# Patient Record
Sex: MALE | Race: WHITE | HISPANIC OR LATINO | Employment: FULL TIME | URBAN - METROPOLITAN AREA
[De-identification: names, ages, dates, MRNs, and addresses within clinical notes are randomized per-mention and may not be internally consistent; named-entity substitution may affect disease eponyms.]

---

## 2020-07-07 ENCOUNTER — APPOINTMENT (OUTPATIENT)
Dept: RADIOLOGY | Facility: MEDICAL CENTER | Age: 35
End: 2020-07-07
Attending: EMERGENCY MEDICINE
Payer: COMMERCIAL

## 2020-07-07 ENCOUNTER — HOSPITAL ENCOUNTER (OUTPATIENT)
Facility: MEDICAL CENTER | Age: 35
End: 2020-07-08
Attending: EMERGENCY MEDICINE | Admitting: HOSPITALIST
Payer: COMMERCIAL

## 2020-07-07 DIAGNOSIS — R07.9 CHEST PAIN, UNSPECIFIED TYPE: ICD-10-CM

## 2020-07-07 PROBLEM — E11.9 TYPE II DIABETES MELLITUS (HCC): Status: ACTIVE | Noted: 2020-07-07

## 2020-07-07 PROBLEM — Z86.59 HISTORY OF PANIC ATTACKS: Status: ACTIVE | Noted: 2020-07-07

## 2020-07-07 PROBLEM — R07.89 ATYPICAL CHEST PAIN: Status: ACTIVE | Noted: 2020-07-07

## 2020-07-07 LAB
ALBUMIN SERPL BCP-MCNC: 4.9 G/DL (ref 3.2–4.9)
ALBUMIN/GLOB SERPL: 1.4 G/DL
ALP SERPL-CCNC: 57 U/L (ref 30–99)
ALT SERPL-CCNC: 12 U/L (ref 2–50)
ANION GAP SERPL CALC-SCNC: 9 MMOL/L (ref 7–16)
AST SERPL-CCNC: 19 U/L (ref 12–45)
BASOPHILS # BLD AUTO: 1.1 % (ref 0–1.8)
BASOPHILS # BLD: 0.07 K/UL (ref 0–0.12)
BILIRUB SERPL-MCNC: 1 MG/DL (ref 0.1–1.5)
BUN SERPL-MCNC: 11 MG/DL (ref 8–22)
CALCIUM SERPL-MCNC: 9.7 MG/DL (ref 8.4–10.2)
CHLORIDE SERPL-SCNC: 100 MMOL/L (ref 96–112)
CO2 SERPL-SCNC: 27 MMOL/L (ref 20–33)
COVID ORDER STATUS COVID19: NORMAL
CREAT SERPL-MCNC: 0.71 MG/DL (ref 0.5–1.4)
EKG IMPRESSION: NORMAL
EKG IMPRESSION: NORMAL
EOSINOPHIL # BLD AUTO: 0.1 K/UL (ref 0–0.51)
EOSINOPHIL NFR BLD: 1.6 % (ref 0–6.9)
ERYTHROCYTE [DISTWIDTH] IN BLOOD BY AUTOMATED COUNT: 36.8 FL (ref 35.9–50)
GLOBULIN SER CALC-MCNC: 3.5 G/DL (ref 1.9–3.5)
GLUCOSE SERPL-MCNC: 188 MG/DL (ref 65–99)
HCT VFR BLD AUTO: 49.5 % (ref 42–52)
HGB BLD-MCNC: 17.3 G/DL (ref 14–18)
IMM GRANULOCYTES # BLD AUTO: 0.02 K/UL (ref 0–0.11)
IMM GRANULOCYTES NFR BLD AUTO: 0.3 % (ref 0–0.9)
LYMPHOCYTES # BLD AUTO: 2.59 K/UL (ref 1–4.8)
LYMPHOCYTES NFR BLD: 41.2 % (ref 22–41)
MCH RBC QN AUTO: 29.8 PG (ref 27–33)
MCHC RBC AUTO-ENTMCNC: 34.9 G/DL (ref 33.7–35.3)
MCV RBC AUTO: 85.3 FL (ref 81.4–97.8)
MONOCYTES # BLD AUTO: 0.37 K/UL (ref 0–0.85)
MONOCYTES NFR BLD AUTO: 5.9 % (ref 0–13.4)
NEUTROPHILS # BLD AUTO: 3.14 K/UL (ref 1.82–7.42)
NEUTROPHILS NFR BLD: 49.9 % (ref 44–72)
NRBC # BLD AUTO: 0 K/UL
NRBC BLD-RTO: 0 /100 WBC
PLATELET # BLD AUTO: 275 K/UL (ref 164–446)
PMV BLD AUTO: 10.4 FL (ref 9–12.9)
POTASSIUM SERPL-SCNC: 4.2 MMOL/L (ref 3.6–5.5)
PROT SERPL-MCNC: 8.4 G/DL (ref 6–8.2)
RBC # BLD AUTO: 5.8 M/UL (ref 4.7–6.1)
SODIUM SERPL-SCNC: 136 MMOL/L (ref 135–145)
TROPONIN T SERPL-MCNC: <6 NG/L (ref 6–19)
TROPONIN T SERPL-MCNC: <6 NG/L (ref 6–19)
WBC # BLD AUTO: 6.3 K/UL (ref 4.8–10.8)

## 2020-07-07 PROCEDURE — 93005 ELECTROCARDIOGRAM TRACING: CPT | Performed by: EMERGENCY MEDICINE

## 2020-07-07 PROCEDURE — 84484 ASSAY OF TROPONIN QUANT: CPT

## 2020-07-07 PROCEDURE — 700111 HCHG RX REV CODE 636 W/ 250 OVERRIDE (IP): Performed by: HOSPITALIST

## 2020-07-07 PROCEDURE — 71045 X-RAY EXAM CHEST 1 VIEW: CPT

## 2020-07-07 PROCEDURE — U0003 INFECTIOUS AGENT DETECTION BY NUCLEIC ACID (DNA OR RNA); SEVERE ACUTE RESPIRATORY SYNDROME CORONAVIRUS 2 (SARS-COV-2) (CORONAVIRUS DISEASE [COVID-19]), AMPLIFIED PROBE TECHNIQUE, MAKING USE OF HIGH THROUGHPUT TECHNOLOGIES AS DESCRIBED BY CMS-2020-01-R: HCPCS

## 2020-07-07 PROCEDURE — G0378 HOSPITAL OBSERVATION PER HR: HCPCS

## 2020-07-07 PROCEDURE — 93005 ELECTROCARDIOGRAM TRACING: CPT

## 2020-07-07 PROCEDURE — 99285 EMERGENCY DEPT VISIT HI MDM: CPT

## 2020-07-07 PROCEDURE — 700102 HCHG RX REV CODE 250 W/ 637 OVERRIDE(OP): Performed by: EMERGENCY MEDICINE

## 2020-07-07 PROCEDURE — 85025 COMPLETE CBC W/AUTO DIFF WBC: CPT

## 2020-07-07 PROCEDURE — 36415 COLL VENOUS BLD VENIPUNCTURE: CPT

## 2020-07-07 PROCEDURE — 99219 PR INITIAL OBSERVATION CARE,LEVL II: CPT | Performed by: HOSPITALIST

## 2020-07-07 PROCEDURE — 96374 THER/PROPH/DIAG INJ IV PUSH: CPT

## 2020-07-07 PROCEDURE — C9803 HOPD COVID-19 SPEC COLLECT: HCPCS | Performed by: HOSPITALIST

## 2020-07-07 PROCEDURE — 80053 COMPREHEN METABOLIC PANEL: CPT

## 2020-07-07 PROCEDURE — A9270 NON-COVERED ITEM OR SERVICE: HCPCS | Performed by: EMERGENCY MEDICINE

## 2020-07-07 RX ORDER — AMOXICILLIN 250 MG
2 CAPSULE ORAL 2 TIMES DAILY
Status: DISCONTINUED | OUTPATIENT
Start: 2020-07-07 | End: 2020-07-08 | Stop reason: HOSPADM

## 2020-07-07 RX ORDER — ONDANSETRON 4 MG/1
4 TABLET, ORALLY DISINTEGRATING ORAL EVERY 4 HOURS PRN
Status: DISCONTINUED | OUTPATIENT
Start: 2020-07-07 | End: 2020-07-08 | Stop reason: HOSPADM

## 2020-07-07 RX ORDER — ASPIRIN 325 MG
325 TABLET ORAL ONCE
Status: DISCONTINUED | OUTPATIENT
Start: 2020-07-07 | End: 2020-07-07

## 2020-07-07 RX ORDER — HYDRALAZINE HYDROCHLORIDE 20 MG/ML
10 INJECTION INTRAMUSCULAR; INTRAVENOUS ONCE
Status: COMPLETED | OUTPATIENT
Start: 2020-07-07 | End: 2020-07-07

## 2020-07-07 RX ORDER — ONDANSETRON 2 MG/ML
4 INJECTION INTRAMUSCULAR; INTRAVENOUS EVERY 4 HOURS PRN
Status: DISCONTINUED | OUTPATIENT
Start: 2020-07-07 | End: 2020-07-08 | Stop reason: HOSPADM

## 2020-07-07 RX ORDER — ASPIRIN 325 MG
325 TABLET ORAL DAILY
Status: DISCONTINUED | OUTPATIENT
Start: 2020-07-08 | End: 2020-07-08 | Stop reason: HOSPADM

## 2020-07-07 RX ORDER — PROMETHAZINE HYDROCHLORIDE 25 MG/1
12.5-25 SUPPOSITORY RECTAL EVERY 4 HOURS PRN
Status: DISCONTINUED | OUTPATIENT
Start: 2020-07-07 | End: 2020-07-08 | Stop reason: HOSPADM

## 2020-07-07 RX ORDER — BISACODYL 10 MG
10 SUPPOSITORY, RECTAL RECTAL
Status: DISCONTINUED | OUTPATIENT
Start: 2020-07-07 | End: 2020-07-08 | Stop reason: HOSPADM

## 2020-07-07 RX ORDER — POLYETHYLENE GLYCOL 3350 17 G/17G
1 POWDER, FOR SOLUTION ORAL
Status: DISCONTINUED | OUTPATIENT
Start: 2020-07-07 | End: 2020-07-08 | Stop reason: HOSPADM

## 2020-07-07 RX ORDER — DIAZEPAM 5 MG/1
5 TABLET ORAL ONCE
Status: COMPLETED | OUTPATIENT
Start: 2020-07-07 | End: 2020-07-07

## 2020-07-07 RX ORDER — PROCHLORPERAZINE EDISYLATE 5 MG/ML
5-10 INJECTION INTRAMUSCULAR; INTRAVENOUS EVERY 4 HOURS PRN
Status: DISCONTINUED | OUTPATIENT
Start: 2020-07-07 | End: 2020-07-08 | Stop reason: HOSPADM

## 2020-07-07 RX ORDER — ASPIRIN 325 MG
325 TABLET ORAL ONCE
Status: COMPLETED | OUTPATIENT
Start: 2020-07-07 | End: 2020-07-07

## 2020-07-07 RX ORDER — PROMETHAZINE HYDROCHLORIDE 25 MG/1
12.5-25 TABLET ORAL EVERY 4 HOURS PRN
Status: DISCONTINUED | OUTPATIENT
Start: 2020-07-07 | End: 2020-07-08 | Stop reason: HOSPADM

## 2020-07-07 RX ORDER — ASPIRIN 81 MG/1
324 TABLET, CHEWABLE ORAL DAILY
Status: DISCONTINUED | OUTPATIENT
Start: 2020-07-08 | End: 2020-07-08 | Stop reason: HOSPADM

## 2020-07-07 RX ORDER — ASPIRIN 600 MG/1
300 SUPPOSITORY RECTAL DAILY
Status: DISCONTINUED | OUTPATIENT
Start: 2020-07-08 | End: 2020-07-08 | Stop reason: HOSPADM

## 2020-07-07 RX ADMIN — DIAZEPAM 5 MG: 5 TABLET ORAL at 19:27

## 2020-07-07 RX ADMIN — HYDRALAZINE HYDROCHLORIDE 10 MG: 20 INJECTION INTRAMUSCULAR; INTRAVENOUS at 22:33

## 2020-07-07 RX ADMIN — ASPIRIN 325 MG ORAL TABLET 325 MG: 325 PILL ORAL at 21:00

## 2020-07-07 ASSESSMENT — LIFESTYLE VARIABLES
HOW MANY TIMES IN THE PAST YEAR HAVE YOU HAD 5 OR MORE DRINKS IN A DAY: 0
AVERAGE NUMBER OF DAYS PER WEEK YOU HAVE A DRINK CONTAINING ALCOHOL: 0
HAVE YOU EVER FELT YOU SHOULD CUT DOWN ON YOUR DRINKING: NO
TOTAL SCORE: 0
ON A TYPICAL DAY WHEN YOU DRINK ALCOHOL HOW MANY DRINKS DO YOU HAVE: 0
HAVE PEOPLE ANNOYED YOU BY CRITICIZING YOUR DRINKING: NO
EVER FELT BAD OR GUILTY ABOUT YOUR DRINKING: NO
EVER_SMOKED: YES
EVER HAD A DRINK FIRST THING IN THE MORNING TO STEADY YOUR NERVES TO GET RID OF A HANGOVER: NO
CONSUMPTION TOTAL: NEGATIVE
ALCOHOL_USE: NO

## 2020-07-07 ASSESSMENT — COGNITIVE AND FUNCTIONAL STATUS - GENERAL
DAILY ACTIVITIY SCORE: 24
MOBILITY SCORE: 24
SUGGESTED CMS G CODE MODIFIER DAILY ACTIVITY: CH
SUGGESTED CMS G CODE MODIFIER MOBILITY: CH

## 2020-07-07 ASSESSMENT — ENCOUNTER SYMPTOMS
SHORTNESS OF BREATH: 1
SORE THROAT: 0
MYALGIAS: 0
FEVER: 0
VOMITING: 0
HEADACHES: 0
BLURRED VISION: 0
NAUSEA: 1
PALPITATIONS: 0
COUGH: 0
DOUBLE VISION: 0
NECK PAIN: 0
DEPRESSION: 0
WEAKNESS: 0
LOSS OF CONSCIOUSNESS: 1
INSOMNIA: 0
BRUISES/BLEEDS EASILY: 0
DIZZINESS: 0

## 2020-07-07 ASSESSMENT — COPD QUESTIONNAIRES
DO YOU EVER COUGH UP ANY MUCUS OR PHLEGM?: NO/ONLY WITH OCCASIONAL COLDS OR INFECTIONS
COPD SCREENING SCORE: 1
HAVE YOU SMOKED AT LEAST 100 CIGARETTES IN YOUR ENTIRE LIFE: NO/DON'T KNOW
IN THE PAST 12 MONTHS DO YOU DO LESS THAN YOU USED TO BECAUSE OF YOUR BREATHING PROBLEMS: DISAGREE/UNSURE
DURING THE PAST 4 WEEKS HOW MUCH DID YOU FEEL SHORT OF BREATH: SOME OF THE TIME

## 2020-07-07 ASSESSMENT — PATIENT HEALTH QUESTIONNAIRE - PHQ9
3. TROUBLE FALLING OR STAYING ASLEEP OR SLEEPING TOO MUCH: NEARLY EVERY DAY
9. THOUGHTS THAT YOU WOULD BE BETTER OFF DEAD, OR OF HURTING YOURSELF: NOT AT ALL
1. LITTLE INTEREST OR PLEASURE IN DOING THINGS: MORE THAN HALF THE DAYS
SUM OF ALL RESPONSES TO PHQ QUESTIONS 1-9: 13
5. POOR APPETITE OR OVEREATING: NEARLY EVERY DAY
6. FEELING BAD ABOUT YOURSELF - OR THAT YOU ARE A FAILURE OR HAVE LET YOURSELF OR YOUR FAMILY DOWN: NOT AL ALL
2. FEELING DOWN, DEPRESSED, IRRITABLE, OR HOPELESS: SEVERAL DAYS
8. MOVING OR SPEAKING SO SLOWLY THAT OTHER PEOPLE COULD HAVE NOTICED. OR THE OPPOSITE, BEING SO FIGETY OR RESTLESS THAT YOU HAVE BEEN MOVING AROUND A LOT MORE THAN USUAL: SEVERAL DAYS
7. TROUBLE CONCENTRATING ON THINGS, SUCH AS READING THE NEWSPAPER OR WATCHING TELEVISION: SEVERAL DAYS
SUM OF ALL RESPONSES TO PHQ9 QUESTIONS 1 AND 2: 3
4. FEELING TIRED OR HAVING LITTLE ENERGY: MORE THAN HALF THE DAYS

## 2020-07-07 ASSESSMENT — FIBROSIS 4 INDEX: FIB4 SCORE: 0.74

## 2020-07-08 ENCOUNTER — APPOINTMENT (OUTPATIENT)
Dept: RADIOLOGY | Facility: MEDICAL CENTER | Age: 35
End: 2020-07-08
Attending: HOSPITALIST
Payer: COMMERCIAL

## 2020-07-08 VITALS
BODY MASS INDEX: 29.04 KG/M2 | RESPIRATION RATE: 18 BRPM | HEIGHT: 71 IN | WEIGHT: 207.45 LBS | DIASTOLIC BLOOD PRESSURE: 78 MMHG | SYSTOLIC BLOOD PRESSURE: 145 MMHG | HEART RATE: 75 BPM | TEMPERATURE: 99.4 F | OXYGEN SATURATION: 97 %

## 2020-07-08 PROBLEM — Z86.59 HISTORY OF PANIC ATTACKS: Status: RESOLVED | Noted: 2020-07-07 | Resolved: 2020-07-08

## 2020-07-08 PROBLEM — R07.89 ATYPICAL CHEST PAIN: Status: RESOLVED | Noted: 2020-07-07 | Resolved: 2020-07-08

## 2020-07-08 LAB
AMPHET UR QL SCN: NEGATIVE
BARBITURATES UR QL SCN: NEGATIVE
BENZODIAZ UR QL SCN: NEGATIVE
BZE UR QL SCN: NEGATIVE
CANNABINOIDS UR QL SCN: POSITIVE
EKG IMPRESSION: NORMAL
GLUCOSE BLD-MCNC: 133 MG/DL (ref 65–99)
GLUCOSE BLD-MCNC: 154 MG/DL (ref 65–99)
METHADONE UR QL SCN: NEGATIVE
OPIATES UR QL SCN: NEGATIVE
OXYCODONE UR QL SCN: NEGATIVE
PCP UR QL SCN: NEGATIVE
PROPOXYPH UR QL SCN: NEGATIVE
SARS-COV-2 RNA RESP QL NAA+PROBE: NOTDETECTED
SPECIMEN SOURCE: NORMAL
TROPONIN T SERPL-MCNC: <6 NG/L (ref 6–19)

## 2020-07-08 PROCEDURE — 93010 ELECTROCARDIOGRAM REPORT: CPT | Performed by: INTERNAL MEDICINE

## 2020-07-08 PROCEDURE — 93005 ELECTROCARDIOGRAM TRACING: CPT | Performed by: HOSPITALIST

## 2020-07-08 PROCEDURE — 93018 CV STRESS TEST I&R ONLY: CPT | Performed by: INTERNAL MEDICINE

## 2020-07-08 PROCEDURE — A9502 TC99M TETROFOSMIN: HCPCS

## 2020-07-08 PROCEDURE — 700102 HCHG RX REV CODE 250 W/ 637 OVERRIDE(OP): Performed by: HOSPITALIST

## 2020-07-08 PROCEDURE — 96372 THER/PROPH/DIAG INJ SC/IM: CPT

## 2020-07-08 PROCEDURE — 82962 GLUCOSE BLOOD TEST: CPT

## 2020-07-08 PROCEDURE — 99217 PR OBSERVATION CARE DISCHARGE: CPT | Performed by: HOSPITALIST

## 2020-07-08 PROCEDURE — A9270 NON-COVERED ITEM OR SERVICE: HCPCS | Performed by: HOSPITALIST

## 2020-07-08 PROCEDURE — 84484 ASSAY OF TROPONIN QUANT: CPT

## 2020-07-08 PROCEDURE — 80307 DRUG TEST PRSMV CHEM ANLYZR: CPT

## 2020-07-08 PROCEDURE — G0378 HOSPITAL OBSERVATION PER HR: HCPCS

## 2020-07-08 PROCEDURE — 78452 HT MUSCLE IMAGE SPECT MULT: CPT | Mod: 26 | Performed by: INTERNAL MEDICINE

## 2020-07-08 RX ADMIN — INSULIN HUMAN 2 UNITS: 100 INJECTION, SOLUTION PARENTERAL at 13:00

## 2020-07-08 RX ADMIN — ASPIRIN 325 MG ORAL TABLET 325 MG: 325 PILL ORAL at 05:32

## 2020-07-08 NOTE — ASSESSMENT & PLAN NOTE
-Patient has diabetes however is noncompliant with his medications for the past few months.  His glucose on admission is 180.  -Order regular insulin sliding scale.

## 2020-07-08 NOTE — PROGRESS NOTES
0000 Assessment complete. No pain at this time. Pt aware of POC and diet. Pt mentioned how he sometimes has drainage that comes out of his left ear and is starting to effect his hearing. Will bring up with day shift.     0413 Pt sleeping, no needs.

## 2020-07-08 NOTE — ASSESSMENT & PLAN NOTE
-Patient status to telemetry unit.  -Initial troponin levels are negative however patient does have some EKG changes on nonconsecutive leads as described above.  -Order stress test in the morning.  -Patient received aspirin in the emergency department.    -Treat pain PRN.

## 2020-07-08 NOTE — ED PROVIDER NOTES
ED Provider Note    CHIEF COMPLAINT  Chief Complaint   Patient presents with   • Numbness   • Rapid Heart Beat       HPI  Dale Espana is a 35 y.o. male who presents with palpitations and chest pain.  The patient states over the weekend he was on a long drive and when he got home on Sunday night he had some marijuana and that intercourse with his wife.  He states after intercourse he became extremely anxious and developed palpitations and diaphoresis.  He states he is having hard time breathing and therefore he contacted EMS.  EMS arrived and told the patient he most likely is having anxiety attack.  Since that time he has had periodic spells of palpitations and difficulty with breathing.  He states at times he also has paresthesias to his hands as well as muscle spasm.  He has not had any associated vomiting or diarrhea.  He states he is otherwise healthy.    REVIEW OF SYSTEMS  See HPI for further details. All other systems are negative.     PAST MEDICAL HISTORY  No past medical history on file.    FAMILY HISTORY  [unfilled]    SOCIAL HISTORY  Social History     Socioeconomic History   • Marital status: Not on file     Spouse name: Not on file   • Number of children: Not on file   • Years of education: Not on file   • Highest education level: Not on file   Occupational History   • Not on file   Social Needs   • Financial resource strain: Not on file   • Food insecurity     Worry: Not on file     Inability: Not on file   • Transportation needs     Medical: Not on file     Non-medical: Not on file   Tobacco Use   • Smoking status: Never Smoker   • Smokeless tobacco: Never Used   Substance and Sexual Activity   • Alcohol use: Yes     Comment: was drinking heavily, stopped x 2 mo ago   • Drug use: Yes     Types: Inhaled     Comment: marijuana occasionally   • Sexual activity: Not on file   Lifestyle   • Physical activity     Days per week: Not on file     Minutes per session: Not on file   • Stress: Not on  "file   Relationships   • Social connections     Talks on phone: Not on file     Gets together: Not on file     Attends [employer] service: Not on file     Active member of club or organization: Not on file     Attends meetings of clubs or organizations: Not on file     Relationship status: Not on file   • Intimate partner violence     Fear of current or ex partner: Not on file     Emotionally abused: Not on file     Physically abused: Not on file     Forced sexual activity: Not on file   Other Topics Concern   • Not on file   Social History Narrative   • Not on file       SURGICAL HISTORY  No past surgical history on file.    CURRENT MEDICATIONS  Home Medications     Reviewed by Darline Chappell R.N. (Registered Nurse) on 07/07/20 at 1717  Med List Status: Complete   Medication Last Dose Status        Patient Christopher Taking any Medications                       ALLERGIES  No Known Allergies    PHYSICAL EXAM  VITAL SIGNS: BP (!) 192/110 Comment: nurse aware patient BP  Pulse 96   Temp 36.5 °C (97.7 °F) (Temporal)   Resp 18   Ht 1.803 m (5' 11\")   Wt 94.9 kg (209 lb 3.5 oz)   SpO2 94%   BMI 29.18 kg/m²       Constitutional: Well developed, Well nourished, No acute distress, Non-toxic appearance.   HENT: Normocephalic, Atraumatic, Bilateral external ears normal, Oropharynx moist, No oral exudates, Nose normal.   Eyes: PERRLA, EOMI, Conjunctiva normal, No discharge.   Neck: Normal range of motion, No tenderness, Supple, No stridor.   Lymphatic: No lymphadenopathy noted.   Cardiovascular: Normal heart rate, Normal rhythm, No murmurs, No rubs, No gallops.   Thorax & Lungs: Normal breath sounds, No respiratory distress, No wheezing, No chest tenderness.   Abdomen: Bowel sounds normal, Soft, No tenderness, No masses, No pulsatile masses.   Skin: Warm, Dry, No erythema, No rash.   Back: No tenderness, No CVA tenderness.   Extremities: Intact distal pulses, No edema, No tenderness, No cyanosis, No clubbing.   Neurologic: " Alert & oriented x 3, Normal motor function, Normal sensory function, No focal deficits noted.   Psychiatric: Anxious    Results for orders placed or performed during the hospital encounter of 07/07/20   CBC with Differential   Result Value Ref Range    WBC 6.3 4.8 - 10.8 K/uL    RBC 5.80 4.70 - 6.10 M/uL    Hemoglobin 17.3 14.0 - 18.0 g/dL    Hematocrit 49.5 42.0 - 52.0 %    MCV 85.3 81.4 - 97.8 fL    MCH 29.8 27.0 - 33.0 pg    MCHC 34.9 33.7 - 35.3 g/dL    RDW 36.8 35.9 - 50.0 fL    Platelet Count 275 164 - 446 K/uL    MPV 10.4 9.0 - 12.9 fL    Neutrophils-Polys 49.90 44.00 - 72.00 %    Lymphocytes 41.20 (H) 22.00 - 41.00 %    Monocytes 5.90 0.00 - 13.40 %    Eosinophils 1.60 0.00 - 6.90 %    Basophils 1.10 0.00 - 1.80 %    Immature Granulocytes 0.30 0.00 - 0.90 %    Nucleated RBC 0.00 /100 WBC    Neutrophils (Absolute) 3.14 1.82 - 7.42 K/uL    Lymphs (Absolute) 2.59 1.00 - 4.80 K/uL    Monos (Absolute) 0.37 0.00 - 0.85 K/uL    Eos (Absolute) 0.10 0.00 - 0.51 K/uL    Baso (Absolute) 0.07 0.00 - 0.12 K/uL    Immature Granulocytes (abs) 0.02 0.00 - 0.11 K/uL    NRBC (Absolute) 0.00 K/uL   Complete Metabolic Panel (CMP)   Result Value Ref Range    Sodium 136 135 - 145 mmol/L    Potassium 4.2 3.6 - 5.5 mmol/L    Chloride 100 96 - 112 mmol/L    Co2 27 20 - 33 mmol/L    Anion Gap 9.0 7.0 - 16.0    Glucose 188 (H) 65 - 99 mg/dL    Bun 11 8 - 22 mg/dL    Creatinine 0.71 0.50 - 1.40 mg/dL    Calcium 9.7 8.4 - 10.2 mg/dL    AST(SGOT) 19 12 - 45 U/L    ALT(SGPT) 12 2 - 50 U/L    Alkaline Phosphatase 57 30 - 99 U/L    Total Bilirubin 1.0 0.1 - 1.5 mg/dL    Albumin 4.9 3.2 - 4.9 g/dL    Total Protein 8.4 (H) 6.0 - 8.2 g/dL    Globulin 3.5 1.9 - 3.5 g/dL    A-G Ratio 1.4 g/dL   Troponin   Result Value Ref Range    Troponin T <6 6 - 19 ng/L   ESTIMATED GFR   Result Value Ref Range    GFR If African American >60 >60 mL/min/1.73 m 2    GFR If Non African American >60 >60 mL/min/1.73 m 2   EKG (NOW)   Result Value Ref Range     Report       [facility]    Test Date:  2020  Pt Name:    ADAM PELAEZ      Department: EDS  MRN:        [id number]                      Room:       [facility] 1  Gender:     Male                         Technician: DARNELL  :        1985                   Requested By:ER TRIAGE PROTOCOL  Order #:    [id number]                    Reading MD: CHIP DESIR MD    Measurements  Intervals                                Axis  Rate:       70                           P:          52  CA:         189                          QRS:        23  QRSD:       82                           T:          29  QT:         354  QTc:        382    Interpretive Statements  Twelve-lead EKG shows a normal sinus rhythm with a ventricular rate of 70,  normal QRS, normal intervals, no ST segment elevation that meets criteria  with 1  box and 2 continuous leads, T wave inverted in lead III.  Electronically Signed On 2020 19:23:32 PDT by CHIP DESIR MD     EKG   Result Value Ref Range    Report       [facility]    Test Date:  2020  Pt Name:    ADAM PELAEZ      Department: [facility]  MRN:        [id number]                      Room:       [facility] 1  Gender:     Male                         Technician: MARIBEL  :        1985                   Requested By:CHIP DESIR  Order #:    [id number]                    Reading MD: CHIP DESIR MD    Measurements  Intervals                                Axis  Rate:       63                           P:          29  CA:         184                          QRS:        25  QRSD:       84                           T:          19  QT:         380  QTc:        389    Interpretive Statements  Twelve-lead EKG shows a normal sinus rhythm with a ventricular rate of 63, T  wave continues to be inverted in lead III, patient also continues have T wave    inversion in V3 as well as some  T wave inversion in V4  Electronically Signed On 7-7-2020 20:48:02 PDT by SALO PARIKH MD         RADIOLOGY/PROCEDURES  DX-CHEST-LIMITED (1 VIEW)   Final Result      1.  Asymmetric alveolar opacity in the left upper lobe projecting over the superior aspect of the left hilum which may represent atelectasis or pneumonia.      2.  Clear right lung.            COURSE & MEDICAL DECISION MAKING  Pertinent Labs & Imaging studies reviewed. (See chart for details)  This a 35-year-old gentleman who presents with palpitations and chest pain.  My suspicion is this is all secondary to anxiety.  His initial EKG did show some ST elevation in lead III but no other continuous leads.  After approximately 2 hours repeated EKG and now it appears he has some elevation in V2 and has acute T wave inversion in V4.  Based on these dynamic changes will admit the patient to the hospital for further cardiac rule out.  He did receive Valium and feels significantly better on repeat exam and his blood pressure has come down.  My suspicion is this is all from anxiety but due to the dynamic EKG changes the patient be admitted for further cardiac rule out.  Otherwise as for other possible sources his abdomen is benign.  The patient does have a possible infiltrate versus atelectasis and I suspect this is more atelectasis as he has not had any fever nor cough to support pneumonia.  The patient will be admitted to the hospitalist in stable condition for further cardiac rule out and work-up.    FINAL IMPRESSION  1.  Chest pain  2.  Hypertension    Disposition  The patient will be admitted in stable condition         Electronically signed by: Salo Parikh M.D., 7/7/2020 7:17 PM

## 2020-07-08 NOTE — H&P
Hospital Medicine History & Physical Note    Date of Service  7/7/2020    Primary Care Physician  No primary care provider on file.    Code Status  Full Code    Chief Complaint  Chest Pain    History of Presenting Illness  35 y.o. male, with history of anxiety and diabetes (however off his medications for a little while), who presented emergency department on 7/7/2020 complaining of chest discomfort, worsening anxiety.  Patient states that 3 days ago had an anxiety/panic attack, so severe that he call EMS.  This happened shortly after he smoked weed.  EMS evaluated him and determined he had a panic attack reason why he was not seen that day.  Since yesterday.  He reports having 3-4 episodes of chest discomfort with radiation to the arm and some arm numbness and tingling, and also radiation to his neck.  Symptoms typically last 15 to 20 minutes.  Today, he noticed worsening shortness of breath, nausea and diaphoresis, reason why he decided to come to the emergency department for further evaluation.  She denies having any recent cardiac work-up done.    ER COURSE:  -Initial EKG showed normal sinus rhythm 63 bpm with inverted T waves in lead III and V3-V4.  He also has 1 mm ST elevation in lead V2.  -Initial troponin was negative.  He is symptoms free at this time.  Next    Review of Systems  Review of Systems   Constitutional: Negative for fever.   HENT: Negative for congestion and sore throat.    Eyes: Negative for blurred vision and double vision.   Respiratory: Positive for shortness of breath. Negative for cough.    Cardiovascular: Positive for chest pain. Negative for palpitations.   Gastrointestinal: Positive for nausea. Negative for vomiting.   Genitourinary: Negative for dysuria and urgency.   Musculoskeletal: Negative for myalgias and neck pain.   Skin: Negative for itching and rash.   Neurological: Positive for loss of consciousness (Chest Pain). Negative for dizziness, weakness and headaches.    Endo/Heme/Allergies: Does not bruise/bleed easily.   Psychiatric/Behavioral: Negative for depression. The patient does not have insomnia.        Past Medical History  Anxiety, type 2 diabetes, noncompliant    Surgical History  Appendectomy    Family History  Father  of a stroke at age 57    Social History   reports that he has never smoked. He has never used smokeless tobacco. He reports current alcohol use. He reports current drug use. Drug: Inhaled.    Allergies  No Known Allergies    Medications  None       Physical Exam  Temp:  [36.5 °C (97.7 °F)] 36.5 °C (97.7 °F)  Pulse:  [63-96] 77  Resp:  [18] 18  BP: (167-192)/() 167/99  SpO2:  [94 %-99 %] 94 %    Physical Exam  Constitutional:       Appearance: Normal appearance.   HENT:      Head: Normocephalic and atraumatic.      Nose: Nose normal.      Mouth/Throat:      Mouth: Mucous membranes are moist.      Pharynx: Oropharynx is clear.   Eyes:      Extraocular Movements: Extraocular movements intact.      Pupils: Pupils are equal, round, and reactive to light.   Neck:      Musculoskeletal: Normal range of motion and neck supple.   Cardiovascular:      Rate and Rhythm: Normal rate and regular rhythm.      Pulses: Normal pulses.      Heart sounds: Normal heart sounds.   Pulmonary:      Effort: Pulmonary effort is normal.      Breath sounds: Normal breath sounds.   Abdominal:      General: Abdomen is flat. Bowel sounds are normal.      Palpations: Abdomen is soft.   Skin:     General: Skin is warm and dry.   Neurological:      General: No focal deficit present.      Mental Status: He is alert and oriented to person, place, and time.   Psychiatric:         Mood and Affect: Mood normal.         Behavior: Behavior normal.         Laboratory:  Recent Labs     20  1841   WBC 6.3   RBC 5.80   HEMOGLOBIN 17.3   HEMATOCRIT 49.5   MCV 85.3   MCH 29.8   MCHC 34.9   RDW 36.8   PLATELETCT 275   MPV 10.4     Recent Labs     20  1841   SODIUM 136   POTASSIUM  4.2   CHLORIDE 100   CO2 27   GLUCOSE 188*   BUN 11   CREATININE 0.71   CALCIUM 9.7     Recent Labs     07/07/20  1841   ALTSGPT 12   ASTSGOT 19   ALKPHOSPHAT 57   TBILIRUBIN 1.0   GLUCOSE 188*         No results for input(s): NTPROBNP in the last 72 hours.      Recent Labs     07/07/20  1841 07/07/20  2030   TROPONINT <6 <6       Imaging:  DX-CHEST-LIMITED (1 VIEW)   Final Result      1.  Asymmetric alveolar opacity in the left upper lobe projecting over the superior aspect of the left hilum which may represent atelectasis or pneumonia.      2.  Clear right lung.      NM-CARDIAC STRESS TEST    (Results Pending)     EKG: NSR @ 63 bpm, T wave inversions only III, V3 and V4.  1 mm ST elevation in lead V2.  On repeat EKG there is also on a slightly 1 mm ST elevation on lead III. patient is symptoms free at this time    Assessment/Plan:    * Atypical chest pain  Assessment & Plan  -Patient status to telemetry unit.  -Initial troponin levels are negative however patient does have some EKG changes on nonconsecutive leads as described above.  -Order stress test in the morning.  -Patient received aspirin in the emergency department.    -Treat pain PRN.    History of panic attacks  Assessment & Plan  -Patient has ongoing history of panic attacks, not currently on any medication and treatment.    Type II diabetes mellitus (HCC)  Assessment & Plan  -Patient has diabetes however is noncompliant with his medications for the past few months.  His glucose on admission is 180.  -Order regular insulin sliding scale.

## 2020-07-08 NOTE — PROGRESS NOTES
Admit profile complete. Patient given IV Hydralazine per MAR. Patient given water and snacks. Patient aware he is NPO at midnight.

## 2020-07-08 NOTE — DISCHARGE INSTRUCTIONS
Please establish with primary care provider.  You may need repeat imaging to follow the results of your chest x-ray.    ANA MARÍA Molina    Discharge Instructions    Discharged to home by car with relative. Discharged via wheelchair, hospital escort: Refused.  Special equipment needed: Not Applicable    Be sure to schedule a follow-up appointment with your primary care doctor or any specialists as instructed.     Discharge Plan:   Diet Plan: Discussed  Activity Level: Discussed  Confirmed Follow up Appointment: Appointment Scheduled  Confirmed Symptoms Management: Discussed  Medication Reconciliation Updated: Yes    I understand that a diet low in cholesterol, fat, and sodium is recommended for good health. Unless I have been given specific instructions below for another diet, I accept this instruction as my diet prescription.   Other diet: Diabetic    Special Instructions: None    · Is patient discharged on Warfarin / Coumadin?   No     Depression / Suicide Risk    As you are discharged from this [facility] facility, it is important to learn how to keep safe from harming yourself.    Recognize the warning signs:  · Abrupt changes in personality, positive or negative- including increase in energy   · Giving away possessions  · Change in eating patterns- significant weight changes-  positive or negative  · Change in sleeping patterns- unable to sleep or sleeping all the time   · Unwillingness or inability to communicate  · Depression  · Unusual sadness, discouragement and loneliness  · Talk of wanting to die  · Neglect of personal appearance   · Rebelliousness- reckless behavior  · Withdrawal from people/activities they love  · Confusion- inability to concentrate     If you or a loved one observes any of these behaviors or has concerns about self-harm, here's what you can do:  · Talk about it- your feelings and reasons for harming yourself  · Remove any means that you might use to hurt yourself (examples:  pills, rope, extension cords, firearm)  · Get professional help from the community (Mental Health, Substance Abuse, psychological counseling)  · Do not be alone:Call your Safe Contact- someone whom you trust who will be there for you.  · Call your local CRISIS HOTLINE 201-2651 or 683-149-4671  · Call your local Children's Mobile Crisis Response Team [place] (423) 452-0887 or www.[employer]  · Call the toll free National Suicide Prevention Hotlines   · National Suicide Prevention Lifeline 789-210-JVPF (1545)  · National Hope Line Network 800-SUICIDE (748-1935)

## 2020-07-08 NOTE — ED TRIAGE NOTES
Pt returned from road trip on Sunday and smoked more weed than he has been and then had sexual intercourse. Afterwards he felt like his heart was racing and felt anxious. Medics responded to his house and told him it was an anxiety attack. Reports continued intermittent L-sided numbness, heart racing and feeling anxious. States he is insulin dependent diabetic that has not been on meds or seen a provider in 3 yrs.    Pt denies respiratory symptoms, fever, or known covid exposure.

## 2020-07-08 NOTE — CARE PLAN
Problem: Communication  Goal: The ability to communicate needs accurately and effectively will improve  Outcome: PROGRESSING AS EXPECTED   Pt able to communicate needs effectively    Problem: Safety  Goal: Will remain free from injury  Outcome: PROGRESSING AS EXPECTED    socks on. Pt steady on his feet. Area free from clutter

## 2020-07-08 NOTE — ED NOTES
Pt currently requesting that an IV not be started until labs are resulted and the physician has seen him.

## 2020-07-08 NOTE — PROGRESS NOTES
Telemetry Shift Summary    Rhythm SR  HR Range 62-70  Ectopy none  Measurements 0.20/0.10/0.38    Per Tatiana    Normal Values  Rhythm SR  HR Range    Measurements 0.12-0.20 / 0.06-0.10  / 0.30-0.52

## 2020-07-08 NOTE — CARE PLAN
Problem: Communication  Goal: The ability to communicate needs accurately and effectively will improve  Outcome: PROGRESSING AS EXPECTED  Note: Pt will feel comfortable communicating questions and concerns with treatment team. Will continue to foster this relationship.      Problem: Safety  Goal: Will remain free from injury  Outcome: PROGRESSING AS EXPECTED  Note: Call light and personal belongings within reach.

## 2020-07-08 NOTE — PROGRESS NOTES
2 RN Skin Check    2 RN skin check complete.   Devices in place: N/A.  Skin assessed under devices: N\A.  Confirmed pressure ulcers found on: N/A.  New potential pressure ulcers noted on N/A. Wound consult placed N/A.  The following interventions in place remind patient to make frquent positional changes and turns.

## 2020-07-08 NOTE — PROGRESS NOTES
Report received from SARAH Perkins. Pt denies needs at this time. Safety precautions in place. Call light within reach.

## 2020-07-09 NOTE — PROGRESS NOTES
Telemetry Shift Summary    Rhythm SR  HR Range   Ectopy rPVC  Measurements 0.20/0.08/0.32        Normal Values  Rhythm SR  HR Range    Measurements 0.12-0.20 / 0.06-0.10  / 0.30-0.52

## 2020-07-09 NOTE — PROGRESS NOTES
Pt verbalized understanding of discharge instructions.  Iv site removed. Pt was escorted to the elevators, refused an escort down to the front of the hospital.  Pt brought all belongingings down with him to his personal vehicle.

## 2020-07-09 NOTE — DISCHARGE SUMMARY
Discharge Summary    CHIEF COMPLAINT ON ADMISSION  Chief Complaint   Patient presents with   • Numbness   • Rapid Heart Beat       Reason for Admission  Neck Pain, Numbness     Admission Date  7/7/2020    CODE STATUS  Full Code    HPI & HOSPITAL COURSE  35 y.o. male, with history of anxiety and diabetes (however off his medications for a little while), who presented emergency department on 7/7/2020 complaining of chest discomfort, worsening anxiety.  Patient states that 3 days prior to presentation he had an anxiety/panic attack so severe that he call EMS.  This happened shortly after he smoked cannabis.  EMS evaluated him and determined he had a panic attack and did not transport him to the ED.  Since the day before admission he reports having 3-4 episodes of chest discomfort with radiation to the arm and some arm numbness and tingling, and also radiation to his neck.  Symptoms typically last 15 to 20 minutes.    On the day of admission he noticed worsening shortness of breath, nausea and diaphoresis. He decided to come to the emergency department for further evaluation.  He denies having any recent cardiac work-up done.     -Initial EKG showed normal sinus rhythm 63 bpm with inverted T waves in lead III and V3-V4.  He also has 1 mm ST elevation in lead V2.  -Serial troponins were negative.  -Chest x-ray showed asymmetric alveolar opacity in the left upper lobe projecting over the superior aspect of the left hilum which may represent atelectasis or pneumonia.  -Patient advised to follow-up with primary care provider for monitoring and possible repeat imaging as well as diabetes management.  -Cardiac stress test was negative    Today the patient is sitting upright in bed, pleasant and cooperative.  He is fully alert and oriented.  He denies headache, dizziness, problems breathing chest pain and any other problems at this time.  He feels well enough to return home.      Therefore, he is discharged in good and stable  condition to home with close outpatient follow-up.    The patient recovered much more quickly than anticipated on admission.    Discharge Date  7/8/2020    FOLLOW UP ITEMS POST DISCHARGE    DISCHARGE DIAGNOSES  Principal Problem (Resolved):    Atypical chest pain POA: Yes  Active Problems:    Type II diabetes mellitus (HCC) POA: Yes  Resolved Problems:    History of panic attacks POA: Yes      FOLLOW UP  No future appointments.      Schedule an appointment as soon as possible for a visit in 1 week  to establish with Primary care    [address] [address] [phone]  Schedule an appointment as soon as possible for a visit in 1 week  to establish Primary Care      MEDICATIONS ON DISCHARGE     Medication List      You have not been prescribed any medications.         Allergies  No Known Allergies    DIET  Orders Placed This Encounter   Procedures   • Diet NPO     Standing Status:   Standing     Number of Occurrences:   8     Order Specific Question:   Restrict to:     Answer:   Sips with Medications [3]   • Discontinue Diet Tray     Standing Status:   Standing     Number of Occurrences:   1       ACTIVITY  As tolerated.  Weight bearing as tolerated    CONSULTATIONS  None     PROCEDURES  none    LABORATORY  Lab Results   Component Value Date    SODIUM 136 07/07/2020    POTASSIUM 4.2 07/07/2020    CHLORIDE 100 07/07/2020    CO2 27 07/07/2020    GLUCOSE 188 (H) 07/07/2020    BUN 11 07/07/2020    CREATININE 0.71 07/07/2020        Lab Results   Component Value Date    WBC 6.3 07/07/2020    HEMOGLOBIN 17.3 07/07/2020    HEMATOCRIT 49.5 07/07/2020    PLATELETCT 275 07/07/2020      Results from last 7 days   Lab Units 07/08/20  0054 07/07/20  2030 07/07/20  1841   TROPONIN T [id number]  ng/L <6 <6 <6       Total time of the discharge process exceeds 31 minutes.    LUCRECIA Molina.

## 2022-04-08 ENCOUNTER — HOSPITAL ENCOUNTER (EMERGENCY)
Facility: MEDICAL CENTER | Age: 37
End: 2022-04-08
Attending: EMERGENCY MEDICINE
Payer: COMMERCIAL

## 2022-04-08 VITALS
RESPIRATION RATE: 16 BRPM | DIASTOLIC BLOOD PRESSURE: 90 MMHG | SYSTOLIC BLOOD PRESSURE: 152 MMHG | OXYGEN SATURATION: 97 % | HEIGHT: 71 IN | HEART RATE: 75 BPM | TEMPERATURE: 98.1 F | BODY MASS INDEX: 29.01 KG/M2 | WEIGHT: 207.23 LBS

## 2022-04-08 DIAGNOSIS — R51.9 NONINTRACTABLE HEADACHE, UNSPECIFIED CHRONICITY PATTERN, UNSPECIFIED HEADACHE TYPE: ICD-10-CM

## 2022-04-08 DIAGNOSIS — R20.2 TINGLING: ICD-10-CM

## 2022-04-08 DIAGNOSIS — R10.12 LEFT UPPER QUADRANT ABDOMINAL PAIN: ICD-10-CM

## 2022-04-08 LAB
ALBUMIN SERPL BCP-MCNC: 4.4 G/DL (ref 3.2–4.9)
ALBUMIN/GLOB SERPL: 1.8 G/DL
ALP SERPL-CCNC: 76 U/L (ref 30–99)
ALT SERPL-CCNC: 15 U/L (ref 2–50)
ANION GAP SERPL CALC-SCNC: 14 MMOL/L (ref 7–16)
APPEARANCE UR: CLEAR
AST SERPL-CCNC: 17 U/L (ref 12–45)
BASOPHILS # BLD AUTO: 0.8 % (ref 0–1.8)
BASOPHILS # BLD: 0.05 K/UL (ref 0–0.12)
BILIRUB SERPL-MCNC: 0.4 MG/DL (ref 0.1–1.5)
BILIRUB UR QL STRIP.AUTO: NEGATIVE
BUN SERPL-MCNC: 10 MG/DL (ref 8–22)
CALCIUM SERPL-MCNC: 9 MG/DL (ref 8.4–10.2)
CHLORIDE SERPL-SCNC: 97 MMOL/L (ref 96–112)
CO2 SERPL-SCNC: 22 MMOL/L (ref 20–33)
COLOR UR: YELLOW
CREAT SERPL-MCNC: 0.57 MG/DL (ref 0.5–1.4)
EOSINOPHIL # BLD AUTO: 0.14 K/UL (ref 0–0.51)
EOSINOPHIL NFR BLD: 2.3 % (ref 0–6.9)
ERYTHROCYTE [DISTWIDTH] IN BLOOD BY AUTOMATED COUNT: 37.2 FL (ref 35.9–50)
GFR SERPLBLD CREATININE-BSD FMLA CKD-EPI: 128 ML/MIN/1.73 M 2
GLOBULIN SER CALC-MCNC: 2.5 G/DL (ref 1.9–3.5)
GLUCOSE SERPL-MCNC: 255 MG/DL (ref 65–99)
GLUCOSE UR STRIP.AUTO-MCNC: >=1000 MG/DL
HCT VFR BLD AUTO: 44.7 % (ref 42–52)
HGB BLD-MCNC: 16.1 G/DL (ref 14–18)
IMM GRANULOCYTES # BLD AUTO: 0.03 K/UL (ref 0–0.11)
IMM GRANULOCYTES NFR BLD AUTO: 0.5 % (ref 0–0.9)
KETONES UR STRIP.AUTO-MCNC: ABNORMAL MG/DL
LEUKOCYTE ESTERASE UR QL STRIP.AUTO: NEGATIVE
LIPASE SERPL-CCNC: 14 U/L (ref 7–58)
LYMPHOCYTES # BLD AUTO: 2.19 K/UL (ref 1–4.8)
LYMPHOCYTES NFR BLD: 35.8 % (ref 22–41)
MCH RBC QN AUTO: 30.4 PG (ref 27–33)
MCHC RBC AUTO-ENTMCNC: 36 G/DL (ref 33.7–35.3)
MCV RBC AUTO: 84.3 FL (ref 81.4–97.8)
MICRO URNS: ABNORMAL
MONOCYTES # BLD AUTO: 0.4 K/UL (ref 0–0.85)
MONOCYTES NFR BLD AUTO: 6.5 % (ref 0–13.4)
NEUTROPHILS # BLD AUTO: 3.31 K/UL (ref 1.82–7.42)
NEUTROPHILS NFR BLD: 54.1 % (ref 44–72)
NITRITE UR QL STRIP.AUTO: NEGATIVE
NRBC # BLD AUTO: 0 K/UL
NRBC BLD-RTO: 0 /100 WBC
PH UR STRIP.AUTO: 6 [PH] (ref 5–8)
PLATELET # BLD AUTO: 244 K/UL (ref 164–446)
PMV BLD AUTO: 10.6 FL (ref 9–12.9)
POTASSIUM SERPL-SCNC: 3.9 MMOL/L (ref 3.6–5.5)
PROT SERPL-MCNC: 6.9 G/DL (ref 6–8.2)
PROT UR QL STRIP: NEGATIVE MG/DL
RBC # BLD AUTO: 5.3 M/UL (ref 4.7–6.1)
RBC UR QL AUTO: NEGATIVE
SODIUM SERPL-SCNC: 133 MMOL/L (ref 135–145)
SP GR UR STRIP.AUTO: <=1.005
WBC # BLD AUTO: 6.1 K/UL (ref 4.8–10.8)

## 2022-04-08 PROCEDURE — 85025 COMPLETE CBC W/AUTO DIFF WBC: CPT

## 2022-04-08 PROCEDURE — 36415 COLL VENOUS BLD VENIPUNCTURE: CPT

## 2022-04-08 PROCEDURE — 99284 EMERGENCY DEPT VISIT MOD MDM: CPT

## 2022-04-08 PROCEDURE — 83690 ASSAY OF LIPASE: CPT

## 2022-04-08 PROCEDURE — 80053 COMPREHEN METABOLIC PANEL: CPT

## 2022-04-08 PROCEDURE — 81003 URINALYSIS AUTO W/O SCOPE: CPT

## 2022-04-08 ASSESSMENT — FIBROSIS 4 INDEX: FIB4 SCORE: 0.76

## 2022-04-08 ASSESSMENT — PAIN DESCRIPTION - PAIN TYPE: TYPE: ACUTE PAIN

## 2022-04-09 NOTE — ED PROVIDER NOTES
ER Provider Note     Scribed for Seven Barrett M.D. by Santo Cat. 4/8/2022, 9:11 PM.    Primary Care Provider: No primary care provider on file.  Means of Arrival: Walk in   History obtained from: Patient  History limited by: None     CHIEF COMPLAINT  Chief Complaint   Patient presents with    Numbness     Left side of body feels weak and tingly.    Abdominal Pain     RUQ and LLQ abd pains, intermittent of 3-4 hours       HPI  Dale Espana is a 36 y.o. male who presents to the Emergency Department for evaluation of left sided facial numbness onset prior to arrival. Patient endorses associated headache, abdominal pain, and anxiety. He denies attempting any alleviating factors. Patient states there is the possibility his symptoms are due to anxiety but he has since calmed down significantly since symptoms onset.    REVIEW OF SYSTEMS  See HPI for further details. All other systems are negative.   C    PAST MEDICAL HISTORY   None noted when reviewed    SURGICAL HISTORY  patient denies any surgical history    SOCIAL HISTORY  Social History     Tobacco Use    Smoking status: Never Smoker    Smokeless tobacco: Never Used   Vaping Use    Vaping Use: Never used   Substance Use Topics    Alcohol use: Yes     Comment: was drinking heavily, stopped x 2 mo ago    Drug use: Yes     Types: Inhaled     Comment: marijuana occasionally      Social History     Substance and Sexual Activity   Drug Use Yes    Types: Inhaled    Comment: marijuana occasionally       FAMILY HISTORY  Family History   Problem Relation Age of Onset    Hypertension Mother     Hypertension Father     Stroke Father        CURRENT MEDICATIONS  Home Medications       Reviewed by Jack Jack R.N. (Registered Nurse) on 04/08/22 at 2012  Med List Status: Complete     Medication Last Dose Status        Patient Christopher Taking any Medications                           ALLERGIES  No Known Allergies    PHYSICAL EXAM  VITAL SIGNS: BP (!) 181/108    "Pulse 85   Temp 36.8 °C (98.2 °F) (Temporal)   Resp 18   Ht 1.803 m (5' 11\")   Wt 94 kg (207 lb 3.7 oz)   SpO2 97%   BMI 28.90 kg/m²      Constitutional: Alert in no apparent distress.  HENT: No signs of trauma, Bilateral external ears normal, Nose normal.   Eyes: Pupils are equal and reactive, Conjunctiva normal, Non-icteric.   Neck: Normal range of motion, No tenderness, Supple, No stridor.   Lymphatic: No lymphadenopathy noted.   Cardiovascular: Regular rate and rhythm, no palpable thrill  Thorax & Lungs: No respiratory distress,  No chest tenderness.  CTAB  Abdomen: Bowel sounds normal, Soft, No tenderness, No masses, No pulsatile masses. No peritoneal signs.  Skin: Warm, Dry, No erythema, No rash.   Back: No bony tenderness, No CVA tenderness.   Extremities: Intact distal pulses, No edema, No tenderness, No cyanosis.  Musculoskeletal: Good range of motion in all major joints. No tenderness to palpation or major deformities noted.   Neurologic: Alert , Normal motor function, Normal sensory function, No focal deficits noted.   Psychiatric: Affect normal, Judgment normal, Mood normal.     DIAGNOSTIC STUDIES / PROCEDURES    LABS  Labs Reviewed   CBC WITH DIFFERENTIAL - Abnormal; Notable for the following components:       Result Value    MCHC 36.0 (*)     All other components within normal limits   URINALYSIS,CULTURE IF INDICATED - Abnormal; Notable for the following components:    Glucose >=1000 (*)     Ketones Trace (*)     All other components within normal limits    Narrative:     Indication for culture:->Patient WITHOUT an indwelling Rosas  catheter in place with new onset of Dysuria, Frequency,  Urgency, and/or Suprapubic pain   COMP METABOLIC PANEL   LIPASE     All labs reviewed by me.    COURSE & MEDICAL DECISION MAKING  Pertinent Labs & Imaging studies reviewed. (See chart for details)    This is a 36 y.o. male that presents with what appears to be a atypical migraine.  There is no significant " tenderness on exam however I will evaluate the patient for urinary tract infection as well as pancreatitis and liver dysfunction.  The patient has a normal neurologic exam.  I will reassess after this..     9:11 PM - Patient seen and examined at bedside. Ordered UA culture, CBC with diff, CMP, and lipase.        There is no infection in the urine.  There is no electrolyte derangement.  The CMP is normal.  There is no pancreatitis.  At this time the patient is feeling improved.  He has a benign abdominal exam.  We will discharge the patient home with strict return precautions and follow-up.    FINAL IMPRESSION  No diagnosis found.      Santo ANGULO (Mavis), am scribing for, and in the presence of, Seven Barrett M.D..    Electronically signed by: Santo Eden), 4/8/2022    Seven ANGULO M.D. personally performed the services described in this documentation, as scribed by Santo Cat in my presence, and it is both accurate and complete.     The note accurately reflects work and decisions made by me.  Seven Barrett M.D.  4/8/2022  11:04 PM

## 2022-04-09 NOTE — ED TRIAGE NOTES
"Chief Complaint   Patient presents with   • Numbness     Left side of body feels weak and tingly.   • Abdominal Pain     RUQ and LLQ abd pains, intermittent of 3-4 hours     ED Triage Vitals [04/08/22 2009]   Enc Vitals Group      Blood Pressure (!) 181/108      Pulse 85      Respiration 18      Temperature 36.8 °C (98.2 °F)      Temp src Temporal      Pulse Oximetry 97 %      Weight 94 kg (207 lb 3.7 oz)      Height 1.803 m (5' 11\")     Symptoms ongoing for one hour. Patient ambulates with steady gait. No ataxia noted. Patient states that he has been \"sometimes get like this when I have a lot of stress lately.\"  "

## 2024-07-02 ENCOUNTER — HOSPITAL ENCOUNTER (OUTPATIENT)
Dept: LAB | Facility: MEDICAL CENTER | Age: 39
End: 2024-07-02
Attending: FAMILY MEDICINE
Payer: COMMERCIAL

## 2024-07-02 LAB
25(OH)D3 SERPL-MCNC: 28 NG/ML (ref 30–100)
ALBUMIN SERPL BCP-MCNC: 4.2 G/DL (ref 3.2–4.9)
ALBUMIN/GLOB SERPL: 1.4 G/DL
ALP SERPL-CCNC: 79 U/L (ref 30–99)
ALT SERPL-CCNC: 20 U/L (ref 2–50)
ANION GAP SERPL CALC-SCNC: 11 MMOL/L (ref 7–16)
AST SERPL-CCNC: 24 U/L (ref 12–45)
BASOPHILS # BLD AUTO: 1 % (ref 0–1.8)
BASOPHILS # BLD: 0.05 K/UL (ref 0–0.12)
BILIRUB SERPL-MCNC: 0.6 MG/DL (ref 0.1–1.5)
BUN SERPL-MCNC: 12 MG/DL (ref 8–22)
CALCIUM ALBUM COR SERPL-MCNC: 8.7 MG/DL (ref 8.5–10.5)
CALCIUM SERPL-MCNC: 8.9 MG/DL (ref 8.5–10.5)
CHLORIDE SERPL-SCNC: 102 MMOL/L (ref 96–112)
CHOLEST SERPL-MCNC: 116 MG/DL (ref 100–199)
CO2 SERPL-SCNC: 25 MMOL/L (ref 20–33)
CREAT SERPL-MCNC: 0.57 MG/DL (ref 0.5–1.4)
CREAT UR-MCNC: 115.78 MG/DL
EOSINOPHIL # BLD AUTO: 0.11 K/UL (ref 0–0.51)
EOSINOPHIL NFR BLD: 2.2 % (ref 0–6.9)
ERYTHROCYTE [DISTWIDTH] IN BLOOD BY AUTOMATED COUNT: 47.7 FL (ref 35.9–50)
EST. AVERAGE GLUCOSE BLD GHB EST-MCNC: 105 MG/DL
GFR SERPLBLD CREATININE-BSD FMLA CKD-EPI: 126 ML/MIN/1.73 M 2
GLOBULIN SER CALC-MCNC: 3 G/DL (ref 1.9–3.5)
GLUCOSE SERPL-MCNC: 89 MG/DL (ref 65–99)
HBA1C MFR BLD: 5.3 % (ref 4–5.6)
HCT VFR BLD AUTO: 45.5 % (ref 42–52)
HDLC SERPL-MCNC: 70 MG/DL
HGB BLD-MCNC: 15.6 G/DL (ref 14–18)
IMM GRANULOCYTES # BLD AUTO: 0.01 K/UL (ref 0–0.11)
IMM GRANULOCYTES NFR BLD AUTO: 0.2 % (ref 0–0.9)
LDLC SERPL CALC-MCNC: 37 MG/DL
LYMPHOCYTES # BLD AUTO: 1.9 K/UL (ref 1–4.8)
LYMPHOCYTES NFR BLD: 38.8 % (ref 22–41)
MCH RBC QN AUTO: 31 PG (ref 27–33)
MCHC RBC AUTO-ENTMCNC: 34.3 G/DL (ref 32.3–36.5)
MCV RBC AUTO: 90.3 FL (ref 81.4–97.8)
MICROALBUMIN UR-MCNC: <1.2 MG/DL
MICROALBUMIN/CREAT UR: NORMAL MG/G (ref 0–30)
MONOCYTES # BLD AUTO: 0.39 K/UL (ref 0–0.85)
MONOCYTES NFR BLD AUTO: 8 % (ref 0–13.4)
NEUTROPHILS # BLD AUTO: 2.44 K/UL (ref 1.82–7.42)
NEUTROPHILS NFR BLD: 49.8 % (ref 44–72)
NRBC # BLD AUTO: 0 K/UL
NRBC BLD-RTO: 0 /100 WBC (ref 0–0.2)
PLATELET # BLD AUTO: 255 K/UL (ref 164–446)
PMV BLD AUTO: 11 FL (ref 9–12.9)
POTASSIUM SERPL-SCNC: 4.1 MMOL/L (ref 3.6–5.5)
PROT SERPL-MCNC: 7.2 G/DL (ref 6–8.2)
PSA SERPL-MCNC: 0.38 NG/ML (ref 0–4)
RBC # BLD AUTO: 5.04 M/UL (ref 4.7–6.1)
SODIUM SERPL-SCNC: 138 MMOL/L (ref 135–145)
T3FREE SERPL-MCNC: 3.34 PG/ML (ref 2–4.4)
T4 FREE SERPL-MCNC: 1.32 NG/DL (ref 0.93–1.7)
TRIGL SERPL-MCNC: 46 MG/DL (ref 0–149)
TSH SERPL DL<=0.005 MIU/L-ACNC: 1.28 UIU/ML (ref 0.38–5.33)
WBC # BLD AUTO: 4.9 K/UL (ref 4.8–10.8)

## 2024-07-02 PROCEDURE — 84270 ASSAY OF SEX HORMONE GLOBUL: CPT

## 2024-07-02 PROCEDURE — 82306 VITAMIN D 25 HYDROXY: CPT

## 2024-07-02 PROCEDURE — 82570 ASSAY OF URINE CREATININE: CPT

## 2024-07-02 PROCEDURE — 84481 FREE ASSAY (FT-3): CPT

## 2024-07-02 PROCEDURE — 84443 ASSAY THYROID STIM HORMONE: CPT

## 2024-07-02 PROCEDURE — 84153 ASSAY OF PSA TOTAL: CPT

## 2024-07-02 PROCEDURE — 80061 LIPID PANEL: CPT

## 2024-07-02 PROCEDURE — 36415 COLL VENOUS BLD VENIPUNCTURE: CPT

## 2024-07-02 PROCEDURE — 84402 ASSAY OF FREE TESTOSTERONE: CPT

## 2024-07-02 PROCEDURE — 84403 ASSAY OF TOTAL TESTOSTERONE: CPT

## 2024-07-02 PROCEDURE — 86800 THYROGLOBULIN ANTIBODY: CPT

## 2024-07-02 PROCEDURE — 82043 UR ALBUMIN QUANTITATIVE: CPT

## 2024-07-02 PROCEDURE — 85025 COMPLETE CBC W/AUTO DIFF WBC: CPT

## 2024-07-02 PROCEDURE — 84439 ASSAY OF FREE THYROXINE: CPT

## 2024-07-02 PROCEDURE — 80053 COMPREHEN METABOLIC PANEL: CPT

## 2024-07-02 PROCEDURE — 83036 HEMOGLOBIN GLYCOSYLATED A1C: CPT

## 2024-07-04 LAB
SHBG SERPL-SCNC: 33 NMOL/L (ref 17–56)
TESTOST FREE MFR SERPL: 1.9 % (ref 1.6–2.9)
TESTOST FREE SERPL-MCNC: 84 PG/ML (ref 47–244)
TESTOST SERPL-MCNC: 447 NG/DL (ref 300–890)
THYROGLOB AB SERPL-ACNC: 1.3 IU/ML (ref 0–4)

## 2024-10-05 ENCOUNTER — OFFICE VISIT (OUTPATIENT)
Dept: URGENT CARE | Facility: CLINIC | Age: 39
End: 2024-10-05
Payer: COMMERCIAL

## 2024-10-05 VITALS
RESPIRATION RATE: 18 BRPM | HEART RATE: 86 BPM | SYSTOLIC BLOOD PRESSURE: 120 MMHG | BODY MASS INDEX: 28 KG/M2 | HEIGHT: 71 IN | OXYGEN SATURATION: 97 % | DIASTOLIC BLOOD PRESSURE: 68 MMHG | TEMPERATURE: 97.1 F | WEIGHT: 200 LBS

## 2024-10-05 DIAGNOSIS — B02.9 HERPES ZOSTER WITHOUT COMPLICATION: ICD-10-CM

## 2024-10-05 PROCEDURE — 99203 OFFICE O/P NEW LOW 30 MIN: CPT | Performed by: NURSE PRACTITIONER

## 2024-10-05 PROCEDURE — 3078F DIAST BP <80 MM HG: CPT | Performed by: NURSE PRACTITIONER

## 2024-10-05 PROCEDURE — 3074F SYST BP LT 130 MM HG: CPT | Performed by: NURSE PRACTITIONER

## 2024-10-05 RX ORDER — VALACYCLOVIR HYDROCHLORIDE 1 G/1
1000 TABLET, FILM COATED ORAL 3 TIMES DAILY
Qty: 21 TABLET | Refills: 0 | Status: SHIPPED | OUTPATIENT
Start: 2024-10-05 | End: 2024-10-12

## 2024-10-05 RX ORDER — PREDNISOLONE ACETATE 10 MG/ML
SUSPENSION/ DROPS OPHTHALMIC
COMMUNITY
Start: 2024-09-05

## 2024-10-05 RX ORDER — BLOOD-GLUCOSE SENSOR
EACH MISCELLANEOUS
COMMUNITY
Start: 2024-10-02

## 2024-10-05 RX ORDER — TADALAFIL 20 MG/1
TABLET ORAL
COMMUNITY
Start: 2024-09-12

## 2024-10-05 RX ORDER — ATORVASTATIN CALCIUM 40 MG/1
TABLET, FILM COATED ORAL
COMMUNITY
Start: 2024-09-03

## 2024-10-05 RX ORDER — SILDENAFIL 100 MG/1
TABLET, FILM COATED ORAL
COMMUNITY
Start: 2024-09-06

## 2024-10-05 RX ORDER — VENLAFAXINE HYDROCHLORIDE 75 MG/1
CAPSULE, EXTENDED RELEASE ORAL
COMMUNITY

## 2024-10-05 RX ORDER — PROPRANOLOL HYDROCHLORIDE 10 MG/1
TABLET ORAL
COMMUNITY

## 2024-10-05 RX ORDER — OMEPRAZOLE 40 MG/1
CAPSULE, DELAYED RELEASE ORAL
COMMUNITY

## 2024-10-05 ASSESSMENT — FIBROSIS 4 INDEX: FIB4 SCORE: 0.86

## 2024-10-26 ENCOUNTER — OFFICE VISIT (OUTPATIENT)
Dept: URGENT CARE | Facility: CLINIC | Age: 39
End: 2024-10-26
Payer: COMMERCIAL

## 2024-10-26 VITALS
HEART RATE: 82 BPM | DIASTOLIC BLOOD PRESSURE: 60 MMHG | OXYGEN SATURATION: 98 % | WEIGHT: 194 LBS | TEMPERATURE: 98.1 F | HEIGHT: 71 IN | BODY MASS INDEX: 27.16 KG/M2 | SYSTOLIC BLOOD PRESSURE: 96 MMHG

## 2024-10-26 DIAGNOSIS — L23.9 ALLERGIC DERMATITIS: ICD-10-CM

## 2024-10-26 PROCEDURE — 3074F SYST BP LT 130 MM HG: CPT | Performed by: PHYSICIAN ASSISTANT

## 2024-10-26 PROCEDURE — 3078F DIAST BP <80 MM HG: CPT | Performed by: PHYSICIAN ASSISTANT

## 2024-10-26 PROCEDURE — 99213 OFFICE O/P EST LOW 20 MIN: CPT | Performed by: PHYSICIAN ASSISTANT

## 2024-10-26 RX ORDER — METHYLPREDNISOLONE 4 MG/1
TABLET ORAL
Qty: 21 TABLET | Refills: 0 | Status: SHIPPED | OUTPATIENT
Start: 2024-10-26

## 2024-10-26 RX ORDER — HYDROXYZINE HYDROCHLORIDE 25 MG/1
25 TABLET, FILM COATED ORAL 3 TIMES DAILY PRN
Qty: 20 TABLET | Refills: 0 | Status: SHIPPED | OUTPATIENT
Start: 2024-10-26

## 2024-10-26 ASSESSMENT — FIBROSIS 4 INDEX: FIB4 SCORE: 0.86
